# Patient Record
Sex: FEMALE | Employment: FULL TIME | URBAN - METROPOLITAN AREA
[De-identification: names, ages, dates, MRNs, and addresses within clinical notes are randomized per-mention and may not be internally consistent; named-entity substitution may affect disease eponyms.]

---

## 2021-07-20 ENCOUNTER — APPOINTMENT (OUTPATIENT)
Dept: URGENT CARE | Facility: MEDICAL CENTER | Age: 21
End: 2021-07-20

## 2021-07-20 DIAGNOSIS — Z02.1 PRE-EMPLOYMENT HEALTH SCREENING EXAMINATION: ICD-10-CM

## 2021-07-20 PROCEDURE — 86787 VARICELLA-ZOSTER ANTIBODY: CPT

## 2021-07-22 LAB — VZV IGG SER IA-ACNC: NORMAL

## 2023-01-08 ENCOUNTER — OFFICE VISIT (OUTPATIENT)
Dept: URGENT CARE | Facility: MEDICAL CENTER | Age: 23
End: 2023-01-08

## 2023-01-08 VITALS
HEART RATE: 84 BPM | OXYGEN SATURATION: 96 % | TEMPERATURE: 97.6 F | BODY MASS INDEX: 24.16 KG/M2 | HEIGHT: 65 IN | RESPIRATION RATE: 17 BRPM | WEIGHT: 145 LBS

## 2023-01-08 DIAGNOSIS — J01.00 ACUTE NON-RECURRENT MAXILLARY SINUSITIS: Primary | ICD-10-CM

## 2023-01-08 DIAGNOSIS — J06.9 ACUTE URI: ICD-10-CM

## 2023-01-08 RX ORDER — SERTRALINE HYDROCHLORIDE 100 MG/1
100 TABLET, FILM COATED ORAL DAILY
COMMUNITY
Start: 2022-12-27

## 2023-01-08 RX ORDER — DEXTROMETHORPHAN HYDROBROMIDE AND PROMETHAZINE HYDROCHLORIDE 15; 6.25 MG/5ML; MG/5ML
5 SOLUTION ORAL 4 TIMES DAILY PRN
Qty: 120 ML | Refills: 0 | Status: SHIPPED | OUTPATIENT
Start: 2023-01-08

## 2023-01-08 RX ORDER — NORETHINDRONE ACETATE AND ETHINYL ESTRADIOL 1.5; 3 MG/1; UG/1
1 TABLET ORAL DAILY
COMMUNITY
Start: 2022-10-19

## 2023-01-08 RX ORDER — AMOXICILLIN 875 MG/1
875 TABLET, COATED ORAL 2 TIMES DAILY
Qty: 20 TABLET | Refills: 0 | Status: SHIPPED | OUTPATIENT
Start: 2023-01-08 | End: 2023-01-18

## 2023-01-08 NOTE — PROGRESS NOTES
[facility]        NAME: Prieto Amaya is a 23 y o  female  : 2000    MRN: [id number]  DATE: 2023  TIME: 12:37 PM    Assessment and Plan   Acute non-recurrent maxillary sinusitis [J01 00]  1  Acute non-recurrent maxillary sinusitis  amoxicillin (AMOXIL) 875 mg tablet      2  Acute URI  Promethazine-DM (PHENERGAN-DM) 6 25-15 mg/5 mL oral syrup            Patient Instructions   1  Over-the-counter ibuprofen and/or acetaminophen as needed for pain or fever  2  Oxymetazoline nasal spray 2 sprays in each nostril every 12 hours for no more than the next 5 days as needed for nasal congestion  3  Over-the-counter guaifenesin as needed for mucus relief  4  Gargle salt water as needed for sore throat relief  5  Increase oral fluid consumption  6  Follow-up with primary care provider in 7 days for any persistent symptoms  Chief Complaint     Chief Complaint   Patient presents with   • Cold Like Symptoms     Pt having sinus congestion and sore throat x8 days, seen x6 days ago and covid and strep were both negative  History of Present Illness       15-year-old female with an 8 to 10-day history of worsening nasal congestion, sinus pressure and pain, rhinorrhea, dry cough  Patient does complain of mild fatigue  No fever or chills  No body aches  No chest pain or shortness of breath  No GI symptoms  Patient was seen at another urgent care about 5 days ago and diagnosed with URI  Symptoms have worsened since then  Review of Systems   Review of Systems   Constitutional: Positive for fatigue  Negative for fever  HENT: Positive for congestion, rhinorrhea, sinus pressure and sinus pain  Negative for facial swelling and sore throat  Respiratory: Positive for cough  Cardiovascular: Negative for chest pain  Gastrointestinal: Negative for abdominal pain  Musculoskeletal: Negative for myalgias  Skin: Negative for rash           Current Medications       Current Outpatient Medications:   •  amoxicillin (AMOXIL) 875 mg tablet, Take 1 tablet (875 mg total) by mouth 2 (two) times a day for 10 days, Disp: 20 tablet, Rfl: 0  •  Junel 1 5/30 1 5-30 MG-MCG TABS, Take 1 tablet by mouth daily, Disp: , Rfl:   •  Promethazine-DM (PHENERGAN-DM) 6 25-15 mg/5 mL oral syrup, Take 5 mL by mouth 4 (four) times a day as needed for cough, Disp: 120 mL, Rfl: 0  •  sertraline (ZOLOFT) 100 mg tablet, Take 100 mg by mouth daily, Disp: , Rfl:     Current Allergies     Allergies as of 01/08/2023   • (No Known Allergies)            The following portions of the patient's history were reviewed and updated as appropriate: allergies, current medications, past family history, past medical history, past social history, past surgical history and problem list      No past medical history on file  No past surgical history on file  No family history on file  Medications have been verified  Objective   Pulse 84   Temp 97 6 °F (36 4 °C) (Temporal)   Resp 17   Ht 5' 5" (1 651 m)   Wt 65 8 kg (145 lb)   LMP 12/08/2022   SpO2 96%   BMI 24 13 kg/m²        Physical Exam     Physical Exam  Vitals and nursing note reviewed  Constitutional:       General: She is not in acute distress  Appearance: Normal appearance  She is not ill-appearing or toxic-appearing  HENT:      Head: Normocephalic  Right Ear: Tympanic membrane normal       Left Ear: Tympanic membrane normal       Nose: Congestion and rhinorrhea present  Comments: Mild to moderate left maxillary sinus tenderness  Mouth/Throat:      Mouth: Mucous membranes are moist       Pharynx: Posterior oropharyngeal erythema present  Eyes:      Conjunctiva/sclera: Conjunctivae normal       Pupils: Pupils are equal, round, and reactive to light  Cardiovascular:      Rate and Rhythm: Normal rate and regular rhythm  Pulses: Normal pulses     Pulmonary:      Effort: Pulmonary effort is normal       Breath sounds: Normal breath sounds  Abdominal:      Tenderness: There is no abdominal tenderness  Musculoskeletal:         General: Normal range of motion  Cervical back: Normal range of motion  Skin:     General: Skin is warm and dry  Capillary Refill: Capillary refill takes less than 2 seconds  Neurological:      Mental Status: She is alert and oriented to person, place, and time     Psychiatric:         Mood and Affect: Mood normal          Behavior: Behavior normal

## 2023-01-08 NOTE — PATIENT INSTRUCTIONS
1  Over-the-counter ibuprofen and/or acetaminophen as needed for pain or fever  2  Oxymetazoline nasal spray 2 sprays in each nostril every 12 hours for no more than the next 5 days as needed for nasal congestion  3  Over-the-counter guaifenesin as needed for mucus relief  4  Gargle salt water as needed for sore throat relief  5  Increase oral fluid consumption  6  Follow-up with primary care provider in 7 days for any persistent symptoms 
no

## 2023-06-04 ENCOUNTER — OFFICE VISIT (OUTPATIENT)
Dept: URGENT CARE | Facility: MEDICAL CENTER | Age: 23
End: 2023-06-04
Payer: COMMERCIAL

## 2023-06-04 VITALS
HEIGHT: 66 IN | HEART RATE: 79 BPM | BODY MASS INDEX: 22.34 KG/M2 | WEIGHT: 139 LBS | DIASTOLIC BLOOD PRESSURE: 87 MMHG | RESPIRATION RATE: 18 BRPM | OXYGEN SATURATION: 99 % | TEMPERATURE: 97.9 F | SYSTOLIC BLOOD PRESSURE: 127 MMHG

## 2023-06-04 DIAGNOSIS — J02.9 ACUTE PHARYNGITIS, UNSPECIFIED ETIOLOGY: Primary | ICD-10-CM

## 2023-06-04 DIAGNOSIS — H69.81 DYSFUNCTION OF RIGHT EUSTACHIAN TUBE: ICD-10-CM

## 2023-06-04 LAB — S PYO AG THROAT QL: NEGATIVE

## 2023-06-04 PROCEDURE — 87880 STREP A ASSAY W/OPTIC: CPT | Performed by: PHYSICIAN ASSISTANT

## 2023-06-04 PROCEDURE — 87070 CULTURE OTHR SPECIMN AEROBIC: CPT | Performed by: PHYSICIAN ASSISTANT

## 2023-06-04 PROCEDURE — 99213 OFFICE O/P EST LOW 20 MIN: CPT | Performed by: PHYSICIAN ASSISTANT

## 2023-06-04 RX ORDER — NORETHINDRONE ACETATE/ETHINYL ESTRADIOL AND FERROUS FUMARATE 1.5-30(21)
1 KIT ORAL DAILY
COMMUNITY
Start: 2023-04-07

## 2023-06-04 RX ORDER — PREDNISONE 20 MG/1
TABLET ORAL
Qty: 15 TABLET | Refills: 0 | Status: SHIPPED | OUTPATIENT
Start: 2023-06-04

## 2023-06-04 NOTE — PROGRESS NOTES
[facility]        NAME: Geremias Palacios is a 23 y o  female  : 2000    MRN: [id number]  DATE: 2023  TIME: 9:09 AM    Assessment and Plan   Acute pharyngitis, unspecified etiology [J02 9]  1  Acute pharyngitis, unspecified etiology  predniSONE 20 mg tablet    POCT rapid strepA    Throat culture      2  Dysfunction of right eustachian tube  predniSONE 20 mg tablet            Patient Instructions     1  Over-the-counter acetaminophen and/or ibuprofen as needed for pain  2  Perform eustachian tube exercises as discussed for your ear pain frequently but gently throughout the course of the day  3  Oxymetazoline nasal spray 2 sprays in each nostril every 12 hours for no more than the next 5 days  4   Gargle salt water as needed for sore throat pain  5  Return here for any persistent symptoms at 3-5 days  6   Go to the ER immediately for any worsening symptoms  Chief Complaint     Chief Complaint   Patient presents with   • Earache     Pt having right ear pain and pain on the right side of her throat x3 days  History of Present Illness       64-year-old female patient with a 3-day history of right-sided sore throat, sneezing, right-sided sharp ear pain which is intermittent and waxing and waning  Patient denies fever  No significant nasal congestion or runny nose  Patient denies any right ear bleeding or discharge  No decrease in hearing  Patient states she is able to swallow but painfully  No cough  No fever or chills  No body aches  Review of Systems   Review of Systems   Constitutional: Negative for chills and fever  HENT: Positive for ear pain, postnasal drip and sore throat  Eyes: Negative for pain and visual disturbance  Respiratory: Negative for cough and shortness of breath  Cardiovascular: Negative for chest pain and palpitations  Gastrointestinal: Negative for abdominal pain and vomiting  Genitourinary: Negative for dysuria and hematuria  "  Musculoskeletal: Negative for arthralgias and back pain  Skin: Negative for color change and rash  Neurological: Negative for seizures and syncope  All other systems reviewed and are negative  Current Medications       Current Outpatient Medications:   •  Jamal Fe 1 5/30 1 5-30 MG-MCG tablet, Take 1 tablet by mouth daily, Disp: , Rfl:   •  predniSONE 20 mg tablet, Take 3 tabs all at once daily for 3 days then 2 tabs for 2 days then 1 tab for 2 days  , Disp: 15 tablet, Rfl: 0  •  sertraline (ZOLOFT) 100 mg tablet, Take 100 mg by mouth daily, Disp: , Rfl:   •  Junel 1 5/30 1 5-30 MG-MCG TABS, Take 1 tablet by mouth daily (Patient not taking: Reported on 6/4/2023), Disp: , Rfl:   •  Promethazine-DM (PHENERGAN-DM) 6 25-15 mg/5 mL oral syrup, Take 5 mL by mouth 4 (four) times a day as needed for cough (Patient not taking: Reported on 6/4/2023), Disp: 120 mL, Rfl: 0    Current Allergies     Allergies as of 06/04/2023   • (No Known Allergies)            The following portions of the patient's history were reviewed and updated as appropriate: allergies, current medications, past family history, past medical history, past social history, past surgical history and problem list      History reviewed  No pertinent past medical history  Past Surgical History:   Procedure Laterality Date   • TONSILLECTOMY  2018       No family history on file  Medications have been verified  Objective   /87   Pulse 79   Temp 97 9 °F (36 6 °C) (Temporal)   Resp 18   Ht 5' 6\" (1 676 m)   Wt 63 kg (139 lb)   LMP 05/04/2023   SpO2 99%   BMI 22 44 kg/m²        Physical Exam     Physical Exam  Vitals and nursing note reviewed  Constitutional:       Appearance: Normal appearance  HENT:      Head: Normocephalic  Right Ear: Tympanic membrane normal       Left Ear: Tympanic membrane normal       Nose: Nose normal  No congestion or rhinorrhea        Mouth/Throat:      Mouth: Mucous membranes are moist       " Pharynx: Oropharynx is clear  Posterior oropharyngeal erythema present  Comments: Mild erythema to the right side of the oropharynx  No exudates or swelling seen  Eyes:      Conjunctiva/sclera: Conjunctivae normal       Pupils: Pupils are equal, round, and reactive to light  Cardiovascular:      Rate and Rhythm: Normal rate and regular rhythm  Heart sounds: Normal heart sounds  Pulmonary:      Effort: Pulmonary effort is normal       Breath sounds: Normal breath sounds  Abdominal:      Tenderness: There is no abdominal tenderness  Musculoskeletal:         General: Normal range of motion  Cervical back: Normal range of motion  Lymphadenopathy:      Cervical: Cervical adenopathy present  Skin:     General: Skin is warm and dry  Capillary Refill: Capillary refill takes less than 2 seconds  Neurological:      Mental Status: She is alert and oriented to person, place, and time     Psychiatric:         Mood and Affect: Mood normal          Behavior: Behavior normal

## 2023-06-04 NOTE — PATIENT INSTRUCTIONS
1  Over-the-counter acetaminophen and/or ibuprofen as needed for pain  2  Perform eustachian tube exercises as discussed for your ear pain frequently but gently throughout the course of the day  3  Oxymetazoline nasal spray 2 sprays in each nostril every 12 hours for no more than the next 5 days  4   Gargle salt water as needed for sore throat pain  5  Return here for any persistent symptoms at 3-5 days  6   Go to the ER immediately for any worsening symptoms

## 2023-06-07 LAB — BACTERIA THROAT CULT: NORMAL

## 2025-01-29 ENCOUNTER — TELEPHONE (OUTPATIENT)
Age: 25
End: 2025-01-29

## 2025-01-29 NOTE — TELEPHONE ENCOUNTER
Rec'd call from patient requesting to schedule new patient appt. Offered soonest available in [place] (2/19/25) Patient declined as she prefers [facility] office. Offered next available for [facility]. Patient declined scheduling at this time but will call back if she changes her mind.